# Patient Record
Sex: MALE | Race: WHITE | NOT HISPANIC OR LATINO | Employment: OTHER | URBAN - METROPOLITAN AREA
[De-identification: names, ages, dates, MRNs, and addresses within clinical notes are randomized per-mention and may not be internally consistent; named-entity substitution may affect disease eponyms.]

---

## 2022-02-21 ENCOUNTER — HISTORICAL (OUTPATIENT)
Dept: PREADMISSION TESTING | Facility: HOSPITAL | Age: 67
End: 2022-02-21

## 2022-02-21 ENCOUNTER — HISTORICAL (OUTPATIENT)
Dept: ADMINISTRATIVE | Facility: HOSPITAL | Age: 67
End: 2022-02-21

## 2022-02-22 LAB — SARS-COV-2 RNA RESP QL NAA+PROBE: DETECTED

## 2022-03-10 ENCOUNTER — HISTORICAL (OUTPATIENT)
Dept: ADMINISTRATIVE | Facility: HOSPITAL | Age: 67
End: 2022-03-10

## 2022-05-14 NOTE — OP NOTE
Patient:   Julian Araya             MRN: [id number]            FIN: [id number]               Age:   66 years     Sex:  Male     :  1955   Associated Diagnoses:   None   Author:   Todd Chaudhari MD        DATE OF SURGERY:  3/10/22    SURGEON:  Todd Chaudhari MD    ASSISTANT:  Fe Singh NP    PREOPERATIVE DIAGNOSIS:  Right Inguinal Hernia    POST OPERATIVE DIAGNOSIS:  Same.    PROCEDURE:  Open Right Inguinal Hernia    ANESTHESIA:  General endotracheal anesthesia.    ESTIMATED BLOOD LOSS:  Approximately 20 cc.   Blood administered, none.    LAP AND INSTRUMENT COUNT:  Correct X2 at the end of the case.    SPECIMENS:  hernia sac    INDICATION AND SIGNIFICANT HISTORY:  Patient is a 66-year-old male complaining of bulge in his right groin for the past few weeks.  Patient was worked up clinically and found to have a right inguinal hernia.  Risks and benefits of surgery was discussed with the patient who voiced understanding of risks / benefits and elected to proceed with surgery.        PROCEDURE IN DETAIL:  Once informed consents were obtained, the patient was taken to the OR and placed supine on the OR table.  After general endotracheal anesthesia was induced the abdomen was prepped and draped in the standard surgical fashion.  An incision was made in line with the inguinal ligament in the right lower quadrant.  Dissection was carried out through Heide's fascia to the level of the external oblique aponeurosis.  This was then opened in the line of its fibers sharply and retracted laterally.  The cord and cord structures were dissected off the pelvic floor and wrapped with a Penrose drain for retraction.  The ilioinguinal nerve was dissected off the cord and retracted out of the operative area.  Attention was then redirected to the anterior medial portion of the sac which was dissected and the hernia sac was found.  The patient had a large inguinal hernia sac with chronic scarring to all  surrounding tissue.  Care was taken to slowly and easily dissect all the surrounding structures including the vas deferens off the hernia sac from the cord.  The hernia sac was then opened and no abdominal contents were visualized.  The hernia defect was then closed and high ligated with a 2-0 silk suture followed by a silk simple tie.  The wound was then copiously irrigated and dried.  Due to a moderate degree of laxity in the pelvic floor, a piece of mesh was placed, secured to the pubic tubercle and inguinal ligament laterally, and shelving border medially.  External oblique aponeurosis was closed with a 2-0 Vicryl suture in a running fashion.  Heide's fascia was closed with 3-0 Vicryl suture in a running subcuticular fashion.  The skin was then closed with a 4-0 Vicryl suture in a subcuticular fashion.  The skin was clean and dry and a dry sterile dressing was placed on the wound.  Lap and instrument counts were correct times two at the end of the case.  The patient tolerated the procedure well and was transported to the Recovery Room in good condition.

## 2023-02-08 DIAGNOSIS — Z12.5 SCREENING PSA (PROSTATE SPECIFIC ANTIGEN): Primary | ICD-10-CM

## 2023-02-10 ENCOUNTER — TELEPHONE (OUTPATIENT)
Dept: INTERNAL MEDICINE | Facility: CLINIC | Age: 68
End: 2023-02-10
Payer: MEDICARE

## 2023-02-10 NOTE — TELEPHONE ENCOUNTER
----- Message from Laure Ferro LPN sent at 2/8/2023  8:17 AM CST -----  Regarding: deondre Coleman 2/17 @9:00  Non fasting labs needed.